# Patient Record
Sex: FEMALE | Race: WHITE | URBAN - METROPOLITAN AREA
[De-identification: names, ages, dates, MRNs, and addresses within clinical notes are randomized per-mention and may not be internally consistent; named-entity substitution may affect disease eponyms.]

---

## 2019-04-25 ENCOUNTER — NEW PATIENT (OUTPATIENT)
Dept: URBAN - METROPOLITAN AREA CLINIC 30 | Facility: CLINIC | Age: 64
End: 2019-04-25
Payer: COMMERCIAL

## 2019-04-25 DIAGNOSIS — H00.022 HORDEOLUM INTERNUM (MEIBOMIAN GLAND DYSFUNCTION), RIGHT LOWER LID: ICD-10-CM

## 2019-04-25 DIAGNOSIS — H00.021 HORDEOLUM INTERNUM (MEIBOMIAN GLAND DYSFUNCTION), RIGHT UPPER LID: ICD-10-CM

## 2019-04-25 DIAGNOSIS — Z96.1 PRESENCE OF INTRAOCULAR LENS: ICD-10-CM

## 2019-04-25 DIAGNOSIS — H00.024 HORDEOLUM INTERNUM (MEIBOMIAN GLAND DYSFUNCTION), LEFT UPPER LID: ICD-10-CM

## 2019-04-25 PROCEDURE — 92004 COMPRE OPH EXAM NEW PT 1/>: CPT | Performed by: OPTOMETRIST

## 2019-04-25 RX ORDER — PREDNISOLONE ACETATE 10 MG/ML
1 % SUSPENSION/ DROPS OPHTHALMIC
Qty: 15 | Refills: 0 | Status: INACTIVE
Start: 2019-04-25 | End: 2019-12-12

## 2019-04-25 ASSESSMENT — INTRAOCULAR PRESSURE
OD: 17
OS: 18

## 2019-04-25 ASSESSMENT — VISUAL ACUITY
OS: 20/20
OD: 20/20

## 2019-04-25 ASSESSMENT — KERATOMETRY
OS: 42.40
OD: 42.75

## 2019-06-27 ENCOUNTER — FOLLOW UP ESTABLISHED (OUTPATIENT)
Dept: URBAN - METROPOLITAN AREA CLINIC 30 | Facility: CLINIC | Age: 64
End: 2019-06-27
Payer: COMMERCIAL

## 2019-06-27 DIAGNOSIS — H00.025 HORDEOLUM INTERNUM LEFT LOWER EYELID: ICD-10-CM

## 2019-06-27 PROCEDURE — 83861 MICROFLUID ANALY TEARS: CPT | Performed by: OPTOMETRIST

## 2019-06-27 PROCEDURE — 68761 CLOSE TEAR DUCT OPENING: CPT | Performed by: OPTOMETRIST

## 2019-06-27 PROCEDURE — 0330T TEAR FILM IMG UNI/BI W/I&R: CPT | Performed by: OPTOMETRIST

## 2019-06-27 RX ORDER — ERYTHROMYCIN 5 MG/G
OINTMENT OPHTHALMIC
Qty: 1 | Refills: 0 | Status: INACTIVE
Start: 2019-06-27 | End: 2019-12-12

## 2019-06-27 RX ORDER — DOXYCYCLINE 50 MG/1
50 MG CAPSULE ORAL
Qty: 30 | Refills: 1 | Status: INACTIVE
Start: 2019-06-27 | End: 2019-12-12

## 2019-06-27 RX ORDER — POLYVINYL ALCOHOL 14 MG/ML
1.4 % SOLUTION/ DROPS OPHTHALMIC
Qty: 0 | Refills: 0 | Status: INACTIVE
Start: 2019-06-27 | End: 2020-08-26

## 2019-06-27 ASSESSMENT — INTRAOCULAR PRESSURE
OD: 16
OS: 17

## 2019-12-12 ENCOUNTER — FOLLOW UP ESTABLISHED (OUTPATIENT)
Dept: URBAN - METROPOLITAN AREA CLINIC 30 | Facility: CLINIC | Age: 64
End: 2019-12-12
Payer: MEDICARE

## 2019-12-12 PROCEDURE — 68761 CLOSE TEAR DUCT OPENING: CPT | Performed by: OPTOMETRIST

## 2019-12-12 PROCEDURE — 83861 MICROFLUID ANALY TEARS: CPT | Performed by: OPTOMETRIST

## 2019-12-12 RX ORDER — PREDNISOLONE ACETATE 10 MG/ML
1 % SUSPENSION/ DROPS OPHTHALMIC
Qty: 5 | Refills: 0 | Status: INACTIVE
Start: 2019-12-12 | End: 2020-02-24

## 2019-12-12 ASSESSMENT — INTRAOCULAR PRESSURE
OS: 16
OD: 17

## 2020-01-28 ENCOUNTER — FOLLOW UP ESTABLISHED (OUTPATIENT)
Dept: URBAN - METROPOLITAN AREA CLINIC 30 | Facility: CLINIC | Age: 65
End: 2020-01-28
Payer: MEDICARE

## 2020-01-28 DIAGNOSIS — H16.223 KERATOCONJUNCT SICCA, NOT SPECIFIED AS SJOGREN'S, BILATERAL: Primary | ICD-10-CM

## 2020-01-28 PROCEDURE — 99213 OFFICE O/P EST LOW 20 MIN: CPT | Performed by: OPTOMETRIST

## 2020-01-28 PROCEDURE — 83861 MICROFLUID ANALY TEARS: CPT | Performed by: OPTOMETRIST

## 2020-01-28 RX ORDER — POVIDONE, POLYVINYL ALCOHOL 20; 27 G/1000ML; G/1000ML
SOLUTION OPHTHALMIC
Qty: 1 | Refills: 1 | Status: INACTIVE
Start: 2020-01-28 | End: 2020-08-26

## 2020-01-28 ASSESSMENT — INTRAOCULAR PRESSURE
OD: 17
OS: 17

## 2020-04-28 ENCOUNTER — FOLLOW UP ESTABLISHED (OUTPATIENT)
Dept: URBAN - METROPOLITAN AREA CLINIC 30 | Facility: CLINIC | Age: 65
End: 2020-04-28
Payer: MEDICARE

## 2020-04-28 PROCEDURE — 99212 OFFICE O/P EST SF 10 MIN: CPT | Performed by: OPTOMETRIST

## 2020-04-28 RX ORDER — PREDNISOLONE ACETATE 10 MG/ML
1 % SUSPENSION/ DROPS OPHTHALMIC
Qty: 5 | Refills: 0 | Status: INACTIVE
Start: 2020-04-28 | End: 2020-06-12

## 2020-04-28 ASSESSMENT — INTRAOCULAR PRESSURE
OS: 16
OD: 17

## 2020-05-28 ENCOUNTER — FOLLOW UP ESTABLISHED (OUTPATIENT)
Dept: URBAN - METROPOLITAN AREA CLINIC 30 | Facility: CLINIC | Age: 65
End: 2020-05-28
Payer: MEDICARE

## 2020-05-28 RX ORDER — CYCLOSPORINE 0.5 MG/ML
0.05 % EMULSION OPHTHALMIC
Qty: 1 | Refills: 2 | Status: INACTIVE
Start: 2020-05-28 | End: 2020-05-28

## 2020-05-28 RX ORDER — CYCLOSPORINE 0.5 MG/ML
0.05 % EMULSION OPHTHALMIC
Qty: 180 | Refills: 2 | Status: INACTIVE
Start: 2020-05-28 | End: 2020-08-26

## 2020-05-28 ASSESSMENT — INTRAOCULAR PRESSURE
OD: 16
OS: 16

## 2020-06-12 ENCOUNTER — FOLLOW UP ESTABLISHED (OUTPATIENT)
Dept: URBAN - METROPOLITAN AREA CLINIC 30 | Facility: CLINIC | Age: 65
End: 2020-06-12
Payer: MEDICARE

## 2020-06-12 DIAGNOSIS — H04.221 EPIPHORA DUE TO INSUFFICIENT DRAINAGE, RIGHT EYE: ICD-10-CM

## 2020-06-12 RX ORDER — PREDNISOLONE ACETATE 10 MG/ML
1 % SUSPENSION/ DROPS OPHTHALMIC
Qty: 1 | Refills: 0 | Status: INACTIVE
Start: 2020-06-12 | End: 2020-08-26

## 2020-06-12 ASSESSMENT — INTRAOCULAR PRESSURE
OS: 16
OD: 16

## 2020-08-26 ENCOUNTER — FOLLOW UP ESTABLISHED (OUTPATIENT)
Dept: URBAN - METROPOLITAN AREA CLINIC 30 | Facility: CLINIC | Age: 65
End: 2020-08-26
Payer: MEDICARE

## 2020-08-26 PROCEDURE — 99213 OFFICE O/P EST LOW 20 MIN: CPT | Performed by: OPTOMETRIST

## 2020-08-26 ASSESSMENT — INTRAOCULAR PRESSURE
OS: 14
OD: 14

## 2020-10-30 ENCOUNTER — FOLLOW UP ESTABLISHED (OUTPATIENT)
Dept: URBAN - METROPOLITAN AREA CLINIC 30 | Facility: CLINIC | Age: 65
End: 2020-10-30
Payer: MEDICARE

## 2020-10-30 PROCEDURE — 68761 CLOSE TEAR DUCT OPENING: CPT | Performed by: OPTOMETRIST

## 2020-10-30 PROCEDURE — 83861 MICROFLUID ANALY TEARS: CPT | Performed by: OPTOMETRIST

## 2020-10-30 ASSESSMENT — INTRAOCULAR PRESSURE
OS: 15
OD: 14

## 2021-01-06 ENCOUNTER — FOLLOW UP ESTABLISHED (OUTPATIENT)
Dept: URBAN - METROPOLITAN AREA CLINIC 30 | Facility: CLINIC | Age: 66
End: 2021-01-06
Payer: MEDICARE

## 2021-01-06 PROCEDURE — 83861 MICROFLUID ANALY TEARS: CPT | Performed by: OPTOMETRIST

## 2021-01-06 PROCEDURE — 68761 CLOSE TEAR DUCT OPENING: CPT | Performed by: OPTOMETRIST

## 2021-01-06 ASSESSMENT — INTRAOCULAR PRESSURE
OS: 14
OD: 14

## 2021-02-22 ENCOUNTER — FOLLOW UP ESTABLISHED (OUTPATIENT)
Dept: URBAN - METROPOLITAN AREA CLINIC 30 | Facility: CLINIC | Age: 66
End: 2021-02-22
Payer: MEDICARE

## 2021-02-22 PROCEDURE — 99213 OFFICE O/P EST LOW 20 MIN: CPT | Performed by: OPTOMETRIST

## 2021-02-22 ASSESSMENT — INTRAOCULAR PRESSURE
OD: 17
OS: 13

## 2021-02-22 ASSESSMENT — VISUAL ACUITY
OD: 20/25
OS: 20/25

## 2021-06-11 ENCOUNTER — OFFICE VISIT (OUTPATIENT)
Dept: URBAN - METROPOLITAN AREA CLINIC 30 | Facility: CLINIC | Age: 66
End: 2021-06-11
Payer: MEDICARE

## 2021-06-11 DIAGNOSIS — H25.12 AGE-RELATED NUCLEAR CATARACT, LEFT EYE: ICD-10-CM

## 2021-06-11 PROCEDURE — 92134 CPTRZ OPH DX IMG PST SGM RTA: CPT | Performed by: OPTOMETRIST

## 2021-06-11 PROCEDURE — 92014 COMPRE OPH EXAM EST PT 1/>: CPT | Performed by: OPTOMETRIST

## 2021-06-11 ASSESSMENT — KERATOMETRY
OD: 42.75
OS: 42.37

## 2021-06-11 ASSESSMENT — VISUAL ACUITY
OD: 20/20
OS: 20/25

## 2021-06-11 ASSESSMENT — INTRAOCULAR PRESSURE
OD: 17
OS: 16

## 2021-06-11 NOTE — IMPRESSION/PLAN
Impression: Other vitreous opacities, bilateral: H43.393. Plan: Retina exam appears stable. Signs and symptoms of RD reviewed. RTC STAT if any increased in floaters or loss of VA. See mac oct for findings.

## 2021-06-11 NOTE — IMPRESSION/PLAN
Impression: Keratoconjunctivitis sicca, bilateral
01/2021 Osmo 280,275
1/2020 Osmo [phone],108
6/2019 Schirmer's 9 OU Plan: Some Epiphora OS>OD persist- flow control UL plug. Pt tolerating and prefers vs no plugs. Cont refresh omega AT's qid OU. D/c'd Restasis bid OU d/t incr epiphora. Cont O3's. D/c'd CLW-pt using again, notes helping w tearing. Avoid ceiling fans and A/C. Cont sleep mask. Cont Freshkote TID OU. All four plugs in place. 01/2021 OS UL 0.5 mm EaglePlug TearFlow- notes more tearing. 6/2020 BLL in place; BUL Flow control . 5mm previously- larger lumen OS. 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 Discussed  IPL/Ilux previously.

## 2021-06-11 NOTE — IMPRESSION/PLAN
Impression: Hordeolum internum (Meibomian gland dysfunction), left upper lid Plan: see other plan for clinical correlations

## 2021-06-11 NOTE — IMPRESSION/PLAN
Impression: Age-related nuclear cataract, left eye Plan: Stable. No treatment currently recommended due to level of vision. Patient will monitor vision changes and contact us with any decrease in vision, will re-evaluate cataract on return visit. Generated new SRX today.  RTC 1 year CE

## 2022-01-04 ENCOUNTER — OFFICE VISIT (OUTPATIENT)
Dept: URBAN - METROPOLITAN AREA CLINIC 30 | Facility: CLINIC | Age: 67
End: 2022-01-04
Payer: MEDICARE

## 2022-01-04 PROCEDURE — 68761 CLOSE TEAR DUCT OPENING: CPT | Performed by: OPTOMETRIST

## 2022-01-04 ASSESSMENT — INTRAOCULAR PRESSURE
OD: 17
OS: 16

## 2022-01-04 NOTE — IMPRESSION/PLAN
Impression: Keratoconjunctivitis sicca, bilateral
01/2021 Osmo 280,275
1/2020 Osmo [phone]
6/2019 [facility]irmer[facility] 9 OU Plan: Epiphora persists OU- flow control UL plug OU? Radha Reynoso Pt tolerating and prefers vs no plugs. Cont refresh omega AT's qid OU. O3's. D/c'd CLW-pt using again, notes helping w tearing. Avoid ceiling fans and A/C. +sleep mask. Cont Freshkote TID OU. All four plugs in place. Consider Cequa or Alpine Pick. Redid SRX today due to reading power not strong enough. 01/2021 OS UL 0.5 mm EaglePlug TearFlow- notes more tearing--removed 01/2022 and replaced w same 0.5mm Tear Flow larger lumen. May remove BUL if tearing persist.
6/2020 BLL 0.5mm Ultra Plugs; RUL Flow control . 5mm previously- larger lumen OS. 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 Discussed  IPL/Ilux previously.

## 2022-04-11 ENCOUNTER — OFFICE VISIT (OUTPATIENT)
Dept: URBAN - METROPOLITAN AREA CLINIC 30 | Facility: CLINIC | Age: 67
End: 2022-04-11
Payer: MEDICARE

## 2022-04-11 DIAGNOSIS — H16.223 KERATOCONJUNCTIVITIS SICCA, NOT SPECIFIED AS SJOGREN'S, BILATERAL: ICD-10-CM

## 2022-04-11 DIAGNOSIS — H00.024 HORDEOLUM INTERNUM LEFT UPPER EYELID: Primary | ICD-10-CM

## 2022-04-11 DIAGNOSIS — H43.393 OTHER VITREOUS OPACITIES, BILATERAL: ICD-10-CM

## 2022-04-11 DIAGNOSIS — Z96.1 PRESENCE OF INTRAOCULAR LENS: ICD-10-CM

## 2022-04-11 PROCEDURE — 68761 CLOSE TEAR DUCT OPENING: CPT | Performed by: OPTOMETRIST

## 2022-04-11 ASSESSMENT — INTRAOCULAR PRESSURE
OS: 15
OD: 16

## 2022-04-11 NOTE — IMPRESSION/PLAN
Impression: Hordeolum internum (Meibomian gland dysfunction), left upper lid Plan: See other plan for clinical correlations.

## 2022-04-11 NOTE — IMPRESSION/PLAN
Impression: Keratoconjunctivitis sicca, bilateral
01/2021 Osmo 280,275
1/2020 Osmo [phone]
6/2019 [facility] 9 OU Plan: Epiphora persists some OD, better OS- Pt tolerating and prefers vs no plugs. Cont refresh omega AT's qid OU. O3's. D/c'd CLW-pt using again, notes helping w tearing. Avoid ceiling fans and A/C. +sleep mask. Not using Freshkote TID OU. All four plugs in place. Consider Cequa or Klarity C. 

04/2022 removed and replaced 0.5mm Tear Flow larger lumen. RUL.
 01/2022 replaced 0.5mm Tear Flow larger lumen. 6/2020 BLL 0.5mm Ultra Plugs; RUL Flow control . 5mm previously- larger lumen OS. 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 Discussed  IPL/Ilux previously.

## 2022-04-11 NOTE — IMPRESSION/PLAN
Impression: Other vitreous opacities, bilateral: H43.393. Plan: Retina exam appears stable. Signs and symptoms of RD reviewed. RTC STAT if any increased in floaters or loss of VA. See mac oct for findings today.

## 2022-08-10 ENCOUNTER — OFFICE VISIT (OUTPATIENT)
Dept: URBAN - METROPOLITAN AREA CLINIC 30 | Facility: CLINIC | Age: 67
End: 2022-08-10
Payer: MEDICARE

## 2022-08-10 DIAGNOSIS — E11.9 DIABETES MELLITUS TYPE 2 WITHOUT MENTION OF COMPLICATION: Primary | ICD-10-CM

## 2022-08-10 DIAGNOSIS — H25.12 AGE-RELATED NUCLEAR CATARACT, LEFT EYE: ICD-10-CM

## 2022-08-10 DIAGNOSIS — H43.393 OTHER VITREOUS OPACITIES, BILATERAL: ICD-10-CM

## 2022-08-10 DIAGNOSIS — Z96.1 PRESENCE OF INTRAOCULAR LENS: ICD-10-CM

## 2022-08-10 DIAGNOSIS — H16.223 KERATOCONJUNCTIVITIS SICCA, NOT SPECIFIED AS SJOGREN'S, BILATERAL: ICD-10-CM

## 2022-08-10 PROCEDURE — 92134 CPTRZ OPH DX IMG PST SGM RTA: CPT | Performed by: OPTOMETRIST

## 2022-08-10 PROCEDURE — 92014 COMPRE OPH EXAM EST PT 1/>: CPT | Performed by: OPTOMETRIST

## 2022-08-10 ASSESSMENT — INTRAOCULAR PRESSURE
OS: 18
OD: 16

## 2022-08-10 ASSESSMENT — KERATOMETRY
OS: 42.32
OD: 42.62

## 2022-08-10 ASSESSMENT — VISUAL ACUITY
OD: 20/20
OS: 20/20

## 2022-08-10 NOTE — IMPRESSION/PLAN
Impression: Diabetes mellitus Type 2 without mention of complication: B15.0. Plan: No diabetic retinopathy. NO CME. Recommend yearly diabetic eye exam. Discussed with patient importance of good blood sugar control. Most recent A1C 7.0, per pt. Most recent  , per pt.

## 2022-08-10 NOTE — IMPRESSION/PLAN
Impression: Keratoconjunctivitis sicca, bilateral
01/2021 Osmo 280,275
1/2020 Osmo [phone]
6/2019 [facility] 9 OU Plan: PEK improved OU. Epiphora persists some OD, better OS- Pt tolerating and prefers vs no plugs. Continue refresh omega AT's qid OU. O3's. D/c'd CLW-pt using again, notes helping w tearing. Avoid ceiling fans and A/C. +sleep mask. Not using Freshkote TID OU. All four plugs in place. Consider Cequa or Klarity C. 

04/2022 removed and replaced 0.5mm Tear Flow larger lumen. RUL.
 01/2022 replaced 0.5mm Tear Flow larger lumen. 6/2020 BLL 0.5mm Ultra Plugs; RUL Flow control . 5mm previously- larger lumen OS. 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 Discussed  IPL/Ilux previously.

## 2022-08-10 NOTE — IMPRESSION/PLAN
Impression: Presence of intraocular lens ; OD Plan: Again, remains stable. Well positioned PC IOL(s).

## 2022-08-10 NOTE — IMPRESSION/PLAN
Impression: Other vitreous opacities, bilateral: H43.393. Plan: Retina exam appears stable. Signs and symptoms of RD reviewed. RTC STAT if any increased in floaters or loss of VA. See mac oct for today's findings today.

## 2023-02-10 ENCOUNTER — OFFICE VISIT (OUTPATIENT)
Dept: URBAN - METROPOLITAN AREA CLINIC 30 | Facility: CLINIC | Age: 68
End: 2023-02-10
Payer: MEDICARE

## 2023-02-10 DIAGNOSIS — H16.223 KERATOCONJUNCTIVITIS SICCA, NOT SPECIFIED AS SJOGREN'S, BILATERAL: Primary | ICD-10-CM

## 2023-02-10 PROCEDURE — 83861 MICROFLUID ANALY TEARS: CPT | Performed by: OPTOMETRIST

## 2023-02-10 PROCEDURE — 99213 OFFICE O/P EST LOW 20 MIN: CPT | Performed by: OPTOMETRIST

## 2023-02-10 NOTE — IMPRESSION/PLAN
Impression: Keratoconjunctivitis sicca, bilateral
2/2023 OSMO Below, 279. 
6/2019 [facility] 9 OU Plan: PEK improved OU. Epiphora persists OU- Pt somewhat tolerating and prefers vs no plugs. Not using any gtts. Not using Refresh omega AT's qid OU. O3's. D/c'd CLW-pt using again, notes helping w tearing. Avoid ceiling fans and A/C. Not using sleep mask- unable to tolerate. Not using Freshkote TID OU. Consider Cequa or Herlinda Kavitah. Consider Tyrvaya sample. PLAN: Removed plugs, pt will restart Restasis BID OU. Pt notes burning c Restasis- discussed refrigerating and using ATs after. Restart ATs QID OU.

2/2023 removed 0.5mm Tear Flow larger lumen BULs without complication. 6/2020 BLL 0.5mm Ultra Plugs; RUL Flow control . 5mm previously- larger lumen OS. 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 Discussed  IPL/Ilux previously.

## 2023-05-11 ENCOUNTER — OFFICE VISIT (OUTPATIENT)
Dept: URBAN - METROPOLITAN AREA CLINIC 30 | Facility: CLINIC | Age: 68
End: 2023-05-11
Payer: MEDICARE

## 2023-05-11 DIAGNOSIS — H16.223 KERATOCONJUNCTIVITIS SICCA, NOT SPECIFIED AS SJOGREN'S, BILATERAL: Primary | ICD-10-CM

## 2023-05-11 PROCEDURE — 99214 OFFICE O/P EST MOD 30 MIN: CPT | Performed by: OPTOMETRIST

## 2023-05-11 PROCEDURE — 83861 MICROFLUID ANALY TEARS: CPT | Performed by: OPTOMETRIST

## 2023-05-11 RX ORDER — PREDNISOLONE ACETATE 10 MG/ML
1 % SUSPENSION/ DROPS OPHTHALMIC
Qty: 5 | Refills: 0 | Status: ACTIVE
Start: 2023-05-11

## 2023-05-11 ASSESSMENT — INTRAOCULAR PRESSURE
OD: 14
OS: 14

## 2023-05-11 NOTE — IMPRESSION/PLAN
Impression: Keratoconjunctivitis sicca, bilateral
2/2023 OSMO Below, 279. 
6/2019 [facility] 9 OU OSMO 304, 315 Plan: PEK worse now w/o upper plugs but epiphora. O3's. D/c'd CLW-pt using again, notes helping w tearing- consider BCL's ongoing. Avoid ceiling fans and A/C. Not using sleep mask- unable to tolerate. Not using Freshkote TID OU. Consider Cequa or Boyd Hamming. Gave Tyrvaya sample. Also consider AS. PLAN: Cont Restasis BID OU. Pt notes burning c Restasis- discussed refrigerating and using ATs after. Cont ATs QID OU. Start PA 1% bid OU. 

2/2023 removed 0.5mm Tear Flow larger lumen BULs without complication. 6/2020 BLL 0.5mm Ultra Plugs;   (Previously RUL Flow control . 5mm previously- larger lumen OS). 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 Discussed  IPL/Ilux previously.

## 2023-07-19 ENCOUNTER — OFFICE VISIT (OUTPATIENT)
Dept: URBAN - METROPOLITAN AREA CLINIC 30 | Facility: CLINIC | Age: 68
End: 2023-07-19
Payer: MEDICARE

## 2023-07-19 DIAGNOSIS — H16.223 KERATOCONJUNCTIVITIS SICCA, NOT SPECIFIED AS SJOGREN'S, BILATERAL: Primary | ICD-10-CM

## 2023-07-19 PROCEDURE — 83861 MICROFLUID ANALY TEARS: CPT | Performed by: OPTOMETRIST

## 2023-07-19 PROCEDURE — 99214 OFFICE O/P EST MOD 30 MIN: CPT | Performed by: OPTOMETRIST

## 2023-07-19 RX ORDER — PREDNISOLONE ACETATE 10 MG/ML
1 % SUSPENSION/ DROPS OPHTHALMIC
Qty: 5 | Refills: 0 | Status: ACTIVE
Start: 2023-07-19

## 2023-07-19 ASSESSMENT — INTRAOCULAR PRESSURE
OD: 13
OS: 15

## 2023-07-19 NOTE — IMPRESSION/PLAN
Impression: Keratoconjunctivitis sicca, bilateral
OSMO 311, 307 7/2023 6/2019 [facility] 9 OU OSMO 304, 315 Plan: Symptoms improved. O3's. D/c'd CLW-pt using again, notes helping w tearing- consider BCL's ongoing. Avoid ceiling fans and A/C. Not using sleep mask- unable to tolerate. Not using Freshkote TID OU. Consider Cequa or Claudia Indra. Carmina sample- good improvement but costly. PLAN: Cont Restasis BID OU. Pt notes burning c Restasis- discussed refrigerating and using ATs after. Cont ATs QID OU. Finished PA 1% bid OU- will renew. Start AS QID OU. 

2/2023 removed 0.5mm Tear Flow larger lumen BULs without complication. 6/2020 BLL 0.5mm Ultra Plugs;   (Previously RUL Flow control . 5mm previously- larger lumen OS). 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 Discussed  IPL/Ilux previously.

## 2023-10-19 ENCOUNTER — OFFICE VISIT (OUTPATIENT)
Dept: URBAN - METROPOLITAN AREA CLINIC 30 | Facility: CLINIC | Age: 68
End: 2023-10-19
Payer: MEDICARE

## 2023-10-19 DIAGNOSIS — E11.9 DIABETES MELLITUS TYPE 2 WITHOUT MENTION OF COMPLICATION: Primary | ICD-10-CM

## 2023-10-19 DIAGNOSIS — H16.223 KERATOCONJUNCTIVITIS SICCA, NOT SPECIFIED AS SJOGREN'S, BILATERAL: ICD-10-CM

## 2023-10-19 DIAGNOSIS — H25.12 AGE-RELATED NUCLEAR CATARACT, LEFT EYE: ICD-10-CM

## 2023-10-19 DIAGNOSIS — H43.393 OTHER VITREOUS OPACITIES, BILATERAL: ICD-10-CM

## 2023-10-19 PROCEDURE — 92134 CPTRZ OPH DX IMG PST SGM RTA: CPT | Performed by: OPTOMETRIST

## 2023-10-19 PROCEDURE — 68761 CLOSE TEAR DUCT OPENING: CPT | Performed by: OPTOMETRIST

## 2023-10-19 PROCEDURE — 92014 COMPRE OPH EXAM EST PT 1/>: CPT | Performed by: OPTOMETRIST

## 2023-10-19 PROCEDURE — 83861 MICROFLUID ANALY TEARS: CPT | Performed by: OPTOMETRIST

## 2023-10-19 ASSESSMENT — VISUAL ACUITY
OD: 20/20
OS: 20/20

## 2023-10-19 ASSESSMENT — KERATOMETRY
OS: 42.32
OD: 42.67

## 2023-10-19 ASSESSMENT — INTRAOCULAR PRESSURE
OS: 15
OD: 14

## 2024-01-18 ENCOUNTER — OFFICE VISIT (OUTPATIENT)
Dept: URBAN - METROPOLITAN AREA CLINIC 30 | Facility: CLINIC | Age: 69
End: 2024-01-18
Payer: MEDICARE

## 2024-01-18 DIAGNOSIS — H16.223 KERATOCONJUNCTIVITIS SICCA, NOT SPECIFIED AS SJOGREN'S, BILATERAL: Primary | ICD-10-CM

## 2024-01-18 PROCEDURE — 99214 OFFICE O/P EST MOD 30 MIN: CPT | Performed by: OPTOMETRIST

## 2024-01-18 PROCEDURE — 92071 CONTACT LENS FITTING FOR TX: CPT | Performed by: OPTOMETRIST

## 2024-01-18 RX ORDER — OFLOXACIN 3 MG/ML
0.3 % SOLUTION/ DROPS OPHTHALMIC
Qty: 5 | Refills: 2 | Status: INACTIVE
Start: 2024-01-18 | End: 2024-02-09

## 2024-01-18 ASSESSMENT — INTRAOCULAR PRESSURE
OS: 15
OD: 13

## 2024-02-09 ENCOUNTER — OFFICE VISIT (OUTPATIENT)
Dept: URBAN - METROPOLITAN AREA CLINIC 30 | Facility: CLINIC | Age: 69
End: 2024-02-09
Payer: MEDICARE

## 2024-02-09 PROCEDURE — 99214 OFFICE O/P EST MOD 30 MIN: CPT | Performed by: OPTOMETRIST

## 2024-02-09 PROCEDURE — 92071 CONTACT LENS FITTING FOR TX: CPT | Performed by: OPTOMETRIST

## 2024-02-09 RX ORDER — OFLOXACIN 3 MG/ML
0.3 % SOLUTION/ DROPS OPHTHALMIC
Qty: 5 | Refills: 3 | Status: ACTIVE
Start: 2024-02-09

## 2024-05-03 ENCOUNTER — OFFICE VISIT (OUTPATIENT)
Dept: URBAN - METROPOLITAN AREA CLINIC 30 | Facility: CLINIC | Age: 69
End: 2024-05-03
Payer: MEDICARE

## 2024-05-03 DIAGNOSIS — H16.223 KERATOCONJUNCTIVITIS SICCA, NOT SPECIFIED AS SJOGREN'S, BILATERAL: Primary | ICD-10-CM

## 2024-05-03 PROCEDURE — 99213 OFFICE O/P EST LOW 20 MIN: CPT | Performed by: OPTOMETRIST

## 2025-02-25 ENCOUNTER — OFFICE VISIT (OUTPATIENT)
Dept: URBAN - METROPOLITAN AREA CLINIC 30 | Facility: CLINIC | Age: 70
End: 2025-02-25
Payer: COMMERCIAL

## 2025-02-25 DIAGNOSIS — H16.223 KERATOCONJUNCTIVITIS SICCA, NOT SPECIFIED AS SJOGREN'S, BILATERAL: Primary | ICD-10-CM

## 2025-02-25 PROCEDURE — 99213 OFFICE O/P EST LOW 20 MIN: CPT | Performed by: OPTOMETRIST

## 2025-02-25 RX ORDER — PERFLUOROHEXYLOCTANE 1 MG/MG
100 % SOLUTION OPHTHALMIC
Qty: 3 | Refills: 11 | Status: ACTIVE
Start: 2025-02-25

## 2025-02-25 RX ORDER — PERFLUOROHEXYLOCTANE 1 MG/MG
100 % SOLUTION OPHTHALMIC
Qty: 3 | Refills: 11 | Status: INACTIVE
Start: 2025-02-25 | End: 2025-02-25

## 2025-02-25 ASSESSMENT — INTRAOCULAR PRESSURE
OD: 12
OS: 13

## 2025-04-24 ENCOUNTER — OFFICE VISIT (OUTPATIENT)
Dept: URBAN - METROPOLITAN AREA CLINIC 30 | Facility: CLINIC | Age: 70
End: 2025-04-24
Payer: COMMERCIAL

## 2025-04-24 DIAGNOSIS — H02.889 MEIBOMIAN GLAND DYSFUNCTION OF EYE: ICD-10-CM

## 2025-04-24 DIAGNOSIS — H25.12 AGE-RELATED NUCLEAR CATARACT, LEFT EYE: ICD-10-CM

## 2025-04-24 DIAGNOSIS — E11.9 DIABETES MELLITUS TYPE 2 WITHOUT MENTION OF COMPLICATION: Primary | ICD-10-CM

## 2025-04-24 DIAGNOSIS — D48.19 OTH NEOPLASM OF UNCERTAIN BEHAVIOR OF CONNCTV/SOFT TISS: ICD-10-CM

## 2025-04-24 DIAGNOSIS — H16.223 KERATOCONJUNCTIVITIS SICCA, NOT SPECIFIED AS SJOGREN'S, BILATERAL: ICD-10-CM

## 2025-04-24 PROCEDURE — 92014 COMPRE OPH EXAM EST PT 1/>: CPT | Performed by: OPTOMETRIST

## 2025-04-24 PROCEDURE — 92250 FUNDUS PHOTOGRAPHY W/I&R: CPT | Performed by: OPTOMETRIST

## 2025-04-24 ASSESSMENT — KERATOMETRY
OD: 42.88
OS: 42.38

## 2025-04-24 ASSESSMENT — INTRAOCULAR PRESSURE
OD: 15
OS: 13

## 2025-04-24 ASSESSMENT — VISUAL ACUITY
OS: 20/20
OD: 20/20

## 2025-07-25 ENCOUNTER — OFFICE VISIT (OUTPATIENT)
Dept: URBAN - METROPOLITAN AREA CLINIC 30 | Facility: CLINIC | Age: 70
End: 2025-07-25
Payer: COMMERCIAL

## 2025-07-25 DIAGNOSIS — H16.223 KERATOCONJUNCTIVITIS SICCA, NOT SPECIFIED AS SJOGREN'S, BILATERAL: Primary | ICD-10-CM

## 2025-07-25 PROCEDURE — 83861 MICROFLUID ANALY TEARS: CPT | Performed by: OPTOMETRIST

## 2025-07-25 PROCEDURE — 99213 OFFICE O/P EST LOW 20 MIN: CPT | Performed by: OPTOMETRIST
